# Patient Record
Sex: FEMALE | Race: WHITE | NOT HISPANIC OR LATINO | Employment: FULL TIME | URBAN - METROPOLITAN AREA
[De-identification: names, ages, dates, MRNs, and addresses within clinical notes are randomized per-mention and may not be internally consistent; named-entity substitution may affect disease eponyms.]

---

## 2017-03-06 ENCOUNTER — GENERIC CONVERSION - ENCOUNTER (OUTPATIENT)
Dept: OTHER | Facility: OTHER | Age: 37
End: 2017-03-06

## 2017-03-23 ENCOUNTER — GENERIC CONVERSION - ENCOUNTER (OUTPATIENT)
Dept: OTHER | Facility: OTHER | Age: 37
End: 2017-03-23

## 2017-03-31 ENCOUNTER — GENERIC CONVERSION - ENCOUNTER (OUTPATIENT)
Dept: OTHER | Facility: OTHER | Age: 37
End: 2017-03-31

## 2017-04-24 ENCOUNTER — GENERIC CONVERSION - ENCOUNTER (OUTPATIENT)
Dept: OTHER | Facility: OTHER | Age: 37
End: 2017-04-24

## 2017-06-20 ENCOUNTER — GENERIC CONVERSION - ENCOUNTER (OUTPATIENT)
Dept: OTHER | Facility: OTHER | Age: 37
End: 2017-06-20

## 2017-07-07 ENCOUNTER — APPOINTMENT (OUTPATIENT)
Dept: LAB | Facility: CLINIC | Age: 37
End: 2017-07-07
Payer: COMMERCIAL

## 2017-07-07 ENCOUNTER — ALLSCRIPTS OFFICE VISIT (OUTPATIENT)
Dept: OTHER | Facility: OTHER | Age: 37
End: 2017-07-07

## 2017-07-07 DIAGNOSIS — M25.50 PAIN IN JOINT: ICD-10-CM

## 2017-07-07 DIAGNOSIS — D72.829 ELEVATED WHITE BLOOD CELL COUNT: ICD-10-CM

## 2017-07-07 DIAGNOSIS — G47.8 OTHER SLEEP DISORDERS: ICD-10-CM

## 2017-07-07 DIAGNOSIS — R53.83 OTHER FATIGUE: ICD-10-CM

## 2017-07-07 LAB
CRP SERPL QL: <3 MG/L
ERYTHROCYTE [DISTWIDTH] IN BLOOD BY AUTOMATED COUNT: 13.4 % (ref 11.6–15.1)
ERYTHROCYTE [SEDIMENTATION RATE] IN BLOOD: 4 MM/HOUR (ref 0–20)
HCT VFR BLD AUTO: 40.7 % (ref 34.8–46.1)
HGB BLD-MCNC: 13.6 G/DL (ref 11.5–15.4)
MCH RBC QN AUTO: 30.4 PG (ref 26.8–34.3)
MCHC RBC AUTO-ENTMCNC: 33.4 G/DL (ref 31.4–37.4)
MCV RBC AUTO: 91 FL (ref 82–98)
PLATELET # BLD AUTO: 534 THOUSANDS/UL (ref 149–390)
PMV BLD AUTO: 9.5 FL (ref 8.9–12.7)
RBC # BLD AUTO: 4.48 MILLION/UL (ref 3.81–5.12)
T3FREE SERPL-MCNC: 3.05 PG/ML (ref 2.3–4.2)
T4 FREE SERPL-MCNC: 1.08 NG/DL (ref 0.76–1.46)
TSH SERPL DL<=0.05 MIU/L-ACNC: 2.34 UIU/ML (ref 0.36–3.74)
WBC # BLD AUTO: 7.44 THOUSAND/UL (ref 4.31–10.16)

## 2017-07-07 PROCEDURE — 83516 IMMUNOASSAY NONANTIBODY: CPT

## 2017-07-07 PROCEDURE — 85027 COMPLETE CBC AUTOMATED: CPT

## 2017-07-07 PROCEDURE — 86038 ANTINUCLEAR ANTIBODIES: CPT

## 2017-07-07 PROCEDURE — 82784 ASSAY IGA/IGD/IGG/IGM EACH: CPT

## 2017-07-07 PROCEDURE — 86255 FLUORESCENT ANTIBODY SCREEN: CPT

## 2017-07-07 PROCEDURE — 86225 DNA ANTIBODY NATIVE: CPT

## 2017-07-07 PROCEDURE — 85613 RUSSELL VIPER VENOM DILUTED: CPT

## 2017-07-07 PROCEDURE — 81241 F5 GENE: CPT

## 2017-07-07 PROCEDURE — 84481 FREE ASSAY (FT-3): CPT

## 2017-07-07 PROCEDURE — 86618 LYME DISEASE ANTIBODY: CPT

## 2017-07-07 PROCEDURE — 86200 CCP ANTIBODY: CPT

## 2017-07-07 PROCEDURE — 85732 THROMBOPLASTIN TIME PARTIAL: CPT

## 2017-07-07 PROCEDURE — 86235 NUCLEAR ANTIGEN ANTIBODY: CPT

## 2017-07-07 PROCEDURE — 84439 ASSAY OF FREE THYROXINE: CPT

## 2017-07-07 PROCEDURE — 86140 C-REACTIVE PROTEIN: CPT

## 2017-07-07 PROCEDURE — 84443 ASSAY THYROID STIM HORMONE: CPT

## 2017-07-07 PROCEDURE — 81374 HLA I TYPING 1 ANTIGEN LR: CPT

## 2017-07-07 PROCEDURE — 36415 COLL VENOUS BLD VENIPUNCTURE: CPT

## 2017-07-07 PROCEDURE — 85652 RBC SED RATE AUTOMATED: CPT

## 2017-07-07 PROCEDURE — 85670 THROMBIN TIME PLASMA: CPT

## 2017-07-07 PROCEDURE — 85705 THROMBOPLASTIN INHIBITION: CPT

## 2017-07-08 LAB
DSDNA AB SER-ACNC: 2 IU/ML (ref 0–9)
ENA SS-A AB SER-ACNC: <0.2 AI (ref 0–0.9)
ENA SS-B AB SER-ACNC: <0.2 AI (ref 0–0.9)

## 2017-07-10 ENCOUNTER — GENERIC CONVERSION - ENCOUNTER (OUTPATIENT)
Dept: OTHER | Facility: OTHER | Age: 37
End: 2017-07-10

## 2017-07-10 LAB
B BURGDOR IGG SER IA-ACNC: 0.06
B BURGDOR IGM SER IA-ACNC: 0.24
ENDOMYSIUM IGA SER QL: NEGATIVE
GLIADIN PEPTIDE IGA SER-ACNC: 3 UNITS (ref 0–19)
GLIADIN PEPTIDE IGG SER-ACNC: 4 UNITS (ref 0–19)
IGA SERPL-MCNC: 99 MG/DL (ref 87–352)
RYE IGE QN: NEGATIVE
TTG IGA SER-ACNC: <2 U/ML (ref 0–3)
TTG IGA SER-ACNC: <2 U/ML (ref 0–3)
TTG IGG SER-ACNC: <2 U/ML (ref 0–5)

## 2017-07-11 ENCOUNTER — GENERIC CONVERSION - ENCOUNTER (OUTPATIENT)
Dept: OTHER | Facility: OTHER | Age: 37
End: 2017-07-11

## 2017-07-11 LAB
CCP IGA+IGG SERPL IA-ACNC: 4 UNITS (ref 0–19)
COMMENT: NORMAL
F5 GENE MUT ANL BLD/T: NORMAL

## 2017-07-12 LAB
APTT SCREEN TO CONFIRM RATIO: 1.21 RATIO (ref 0–1.4)
CONFIRM APTT/NORMAL: 44.5 SEC (ref 0–55)
HLA-B27 QL NAA+PROBE: POSITIVE
LA PPP-IMP: NORMAL
LABORATORY COMMENT REPORT: NORMAL
SCREEN APTT: 32.9 SEC (ref 0–51.9)
SCREEN DRVVT: 37.5 SEC (ref 0–47)
THROMBIN TIME: 21.2 SEC (ref 0–23)

## 2017-07-14 ENCOUNTER — GENERIC CONVERSION - ENCOUNTER (OUTPATIENT)
Dept: OTHER | Facility: OTHER | Age: 37
End: 2017-07-14

## 2017-07-26 ENCOUNTER — ALLSCRIPTS OFFICE VISIT (OUTPATIENT)
Dept: OTHER | Facility: OTHER | Age: 37
End: 2017-07-26

## 2017-08-02 ENCOUNTER — GENERIC CONVERSION - ENCOUNTER (OUTPATIENT)
Dept: OTHER | Facility: OTHER | Age: 37
End: 2017-08-02

## 2017-08-10 ENCOUNTER — ALLSCRIPTS OFFICE VISIT (OUTPATIENT)
Dept: OTHER | Facility: OTHER | Age: 37
End: 2017-08-10

## 2017-08-10 ENCOUNTER — APPOINTMENT (OUTPATIENT)
Dept: RADIOLOGY | Facility: CLINIC | Age: 37
End: 2017-08-10
Payer: COMMERCIAL

## 2017-08-10 DIAGNOSIS — M54.50 LOW BACK PAIN: ICD-10-CM

## 2017-08-10 DIAGNOSIS — Z15.89 GENETIC SUSCEPTIBILITY TO OTHER DISEASE(V84.89): ICD-10-CM

## 2017-08-10 PROCEDURE — 72202 X-RAY EXAM SI JOINTS 3/> VWS: CPT

## 2017-09-12 ENCOUNTER — GENERIC CONVERSION - ENCOUNTER (OUTPATIENT)
Dept: OTHER | Facility: OTHER | Age: 37
End: 2017-09-12

## 2017-10-10 ENCOUNTER — GENERIC CONVERSION - ENCOUNTER (OUTPATIENT)
Dept: OTHER | Facility: OTHER | Age: 37
End: 2017-10-10

## 2017-10-18 ENCOUNTER — GENERIC CONVERSION - ENCOUNTER (OUTPATIENT)
Dept: OTHER | Facility: OTHER | Age: 37
End: 2017-10-18

## 2018-01-09 NOTE — MISCELLANEOUS
Message  I received message from patient regarding x-ray results  I called patient at [phone], but there was no answer  I left voicemail explaining that her x-rays were negative for any sort of inflammatory changes  I instructed her to contact our office if she had any additional questions or concerns        Signatures   Electronically signed by : Mckayla Craft DO; Sep 12 2017  3:32PM EST                       (Author)

## 2018-01-10 NOTE — RESULT NOTES
Discussion/Summary   NEGATIVE FOR FACTOR V LAIDEN MUTATION    - DR WATSON      Verified Results  (1) FACTOR V LEIDEN MUTATION DNA ANALYSIS [id number] 02:29PM Kerline Watson Order Number: [id number]     Test Name Result Flag Reference   FACTOR V LEIDEN Comment     Result:  Negative (no mutation found)  Factor V Leiden is a specific mutation (R506Q) in the factor  V gene that is associated with an increased risk of venous  thrombosis  Factor V Leiden is more resistant to  inactivation by activated protein C  As a result, factor V  persists in the circulation leading to a mild hyper-  coagulable state  The Leiden mutation accounts for 90% -  95% of APC resistance  Factor V Leiden has been reported in  patients with deep vein thrombosis, pulmonary embolus,  central retinal vein occlusion, cerebral sinus thrombosis  and hepatic vein thrombosis  Other risk factors to be  considered in the workup for venous thrombosis include the  [id number] mutation in the factor II (prothrombin) gene,  protein S and C deficiency, and antithrombin deficiencies  Anticardiolipin antibody and lupus anticoagulant analysis  may be appropriate for certain patients, as well as  homocysteine levels  Contact your local [facility] for information on how to order  additional testing if desired  COMMENT Comment     **Genetic counselors are available for health care providers to**    discuss results at [phone]  Methodology:  DNA analysis of the Factor V gene was performed by allele-specific  PCR  The diagnostic sensitivity and specificity is >99% for both  Molecular-based testing is highly accurate, but as in any laboratory  test, diagnostic errors may occur  All test results must be combined  with clinical information for the most accurate interpretation    This test was developed and its performance characteristics determined  by [facility]  It has not been cleared or approved by the Food and Drug  Administration  References:  Kylah GROVES (1996)  Clin Lab Med 55:498-102    Astrid Houser, PhD, Himanshu Doherty, PhD, Jeanne Jennings, PhD, RELL Davalos , PhD, Jorge L Dover, PhD, Lorenzo Renee, PhD, Doug Anthony PhD, [facility]     Performed at:  [address] [place] [address]  [id number]  : Lawson Nettles MD, Phone:  [phone]       Signatures   Electronically signed by : Michele Tse MD; Jul 11 2017  3:49PM EST                       (Author)

## 2018-01-12 VITALS
HEART RATE: 64 BPM | HEIGHT: 66 IN | BODY MASS INDEX: 23.95 KG/M2 | WEIGHT: 149 LBS | DIASTOLIC BLOOD PRESSURE: 82 MMHG | SYSTOLIC BLOOD PRESSURE: 120 MMHG

## 2018-01-12 NOTE — RESULT NOTES
Discussion/Summary   one of the arthritis panel called HLA B 27 came back positive which can cause diffuse joint pain  i want her to a rheumatologist  she can see whoever her insurance alllows her and whoever can get her in sooner   - Dr Alcon Walker      Verified Results  (1) HLA B27 [id number] 02:29PM Cisco Watson Order Number: [id number]     Test Name Result Flag Reference   HLA B27 Positive     HLA-B*27 Positive  This patient is positive for HLA-B*27  This procedure rules  out the B*27:06 and 27:09 alleles, which the literature  suggests are not associated with spondyloarthropathies  HLA allele interpretation for all loci based on IMGT/HLA  database version 3 25 0  This test was developed and its performance characteristics  determined by [facility]   It has not been cleared or approved  by the Food and Drug Administration  [facility] CLIA ID Number [id number]  This test was performed using PCR (Polymerase Chain Reaction)/SSOP  (Sequence Specific Oligonucleotide Probes) technique  SBT (Sequence  Based Typing) and/or SSP (Sequence Specific Primers) may be used as  supplemental methods when necessary  Please contact HLA Customer  Service at [phone] if you have any questions     Director of [facility]   Dr Luz Gee, PhD    Performed at:  [address]  : Cuate Syed PhD, Phone:  [phone]       Signatures   Electronically signed by : Renee Molina MD; Jul 14 2017  8:06AM EST                       (Author)

## 2018-01-13 VITALS
BODY MASS INDEX: 23.81 KG/M2 | WEIGHT: 143.5 LBS | HEART RATE: 100 BPM | DIASTOLIC BLOOD PRESSURE: 86 MMHG | SYSTOLIC BLOOD PRESSURE: 116 MMHG | RESPIRATION RATE: 16 BRPM

## 2018-01-14 VITALS
WEIGHT: 149.13 LBS | RESPIRATION RATE: 16 BRPM | DIASTOLIC BLOOD PRESSURE: 82 MMHG | SYSTOLIC BLOOD PRESSURE: 138 MMHG | BODY MASS INDEX: 24.07 KG/M2 | HEART RATE: 64 BPM

## 2018-01-15 NOTE — PROGRESS NOTES
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)   4  Depression (311) (F32 9)    Plan  Arthralgia of multiple joints, Depression    · FLUoxetine HCl - 10 MG Oral Capsule (PROzac); take 1 capsule daily  Arthralgia of multiple joints, HLA B27 positive    · 1 - Erendira Ingram DO ( Rheumatology) Co-Management  *  Status: Active   Requested for: 94WSG3921  Care Summary provided  : Yes  Health Maintenance    · Follow-up visit in 1 year Evaluation and Treatment  Follow-up  Status: Hold For -  Scheduling  Requested for: 56QKH6419   · Begin a limited exercise program ; Status:Complete;   Done: 37GYD4331 10:35AM   · Begin or continue regular aerobic exercise  Gradually work up to at least 3 sessions of 30  minutes of exercise a week ; Status:Complete;   Done: 02MKZ3800 10:35AM   · Brush your teeth 3 times a day and floss at least once a day ; Status:Complete;   Done:  16KWN4235 10:35AM   · Eat a low fat and low cholesterol diet ; Status:Complete;   Done: 76ONC6864 10:35AM   · Use a sun block product with an SPF of 15 or more ; Status:Complete;   Done: 70VRW9361  10:35AM   · Vitamins can help you get daily requirements that your diet may not be giving you ;  Status:Complete;   Done: 05EJC3045 10:35AM   · We recommend routine visits to a dentist ; Status:Complete;   Done: 55WNX8383 10:35AM   · We recommend that you bring your body mass index down to 26 ; Status:Complete;    Done: 97IUE7608 10:35AM   · We recommend that you follow these steps to lower your risk of osteoporosis  ;  Status:Complete;   Done: 26XRQ2136 10:35AM   · We recommend you modify your diet to achieve and maintain a healthy weight  Being  underweight may increase your risk of developing health problems from vitamin and  mineral deficiencies  We recommend a balanced diet rich in fruits and vegetables   You  may also consider increasing your calorie intake by eating more frequently or adding  nuts, avocados, and low-fat cheese or milk to your meals  Please let us know  if you would like to learn more about your nutrition and calorie needs, and additional  options to help you achieve your weight goals ; Status:Complete;   Done: 44KIC5488  10:35AM   · Call [phone] if: You find a new or different kind of lump in your breast ;  Status:Complete;   Done: 98TAG5594 10:35AM   · Call [phone] if: You have any warning signs of skin cancer ; Status:Complete;    Done: 64PDI1485 10:35AM   · Call 911 if: You have symptoms of toxic shock ; Status:Complete;   Done: 16QBM8161  10:35AM  Low back pain    · Cyclobenzaprine HCl - 10 MG Oral Tablet; TAKE 1 TABLET 3 TIMES DAILY AS  NEEDED  Social phobia    · ALPRAZolam 0 25 MG Oral Tablet (Xanax); Take 1 tablet daily    Discussion/Summary  health maintenance visit Currently, she eats an adequate diet  the risks and benefits of cervical cancer screening were discussed cervical cancer screening is needed every three years Breast cancer screening: the risks and benefits of breast cancer screening were discussed and breast cancer screening is not indicated  Colorectal cancer screening: the risks and benefits of colorectal cancer screening were discussed  The immunizations are up to date  She was advised to be evaluated by an ophthalmologist and an audiologist  Advice and education were given regarding aerobic exercise, weight bearing exercise, calcium supplements, vitamin D supplements and contraception  Patient discussion: discussed with the patient  Seeing Dr Rob Noyola next week for her TMJ arthralgia  Chief Complaint  Pt here for Annual Physical      History of Present Illness  HM, Adult Female: The patient is being seen for a health maintenance evaluation  General Health: The patient's health since the last visit is described as good  She has regular dental visits   She complains of vision problems  She denies hearing loss  Immunizations status: not up to date  Lifestyle:  She consumes a diverse and healthy diet  She does not have any weight concerns  She does not exercise regularly  She does not use tobacco  She consumes alcohol  She denies drug use  Reproductive health:  she reports normal menses  she uses no contraception  she is not sexually active  she denies prior pregnancies  Screening: cancer screening reviewed and updated  metabolic screening reviewed and updated  risk screening reviewed and updated  Review of Systems    Constitutional: No fever, no chills, feels well, no tiredness, no recent weight gain or weight loss  Eyes: No complaints of eye pain, no red eyes, no eyesight problems, no discharge, no dry eyes, no itching of eyes  ENT: no complaints of earache, no loss of hearing, no nose bleeds, no nasal discharge, no sore throat, no hoarseness  Cardiovascular: No complaints of slow heart rate, no fast heart rate, no chest pain, no palpitations, no leg claudication, no lower extremity edema  Respiratory: No complaints of shortness of breath, no wheezing, no cough, no SOB on exertion, no orthopnea, no PND  Gastrointestinal: No complaints of abdominal pain, no constipation, no nausea or vomiting, no diarrhea, no bloody stools  Genitourinary: No complaints of dysuria, no incontinence, no pelvic pain, no dysmenorrhea, no vaginal discharge or bleeding  Musculoskeletal: arthralgias and myalgias  Integumentary: No complaints of skin rash or lesions, no itching, no skin wounds, no breast pain or lump  Neurological: No complaints of headache, no confusion, no convulsions, no numbness, no dizziness or fainting, no tingling, no limb weakness, no difficulty walking  Psychiatric: Not suicidal, no sleep disturbance, no anxiety or depression, no change in personality, no emotional problems     Endocrine: No complaints of proptosis, no hot flashes, no muscle weakness, no deepening of the voice, no feelings of weakness  Hematologic/Lymphatic: No complaints of swollen glands, no swollen glands in the neck, does not bleed easily, does not bruise easily  Active Problems    1  Arthralgia of multiple joints (719 49) (M25 50)   2  Attention or concentration deficit (799 51) (R41 840)   3  Depression (311) (F32 9)   4  Encounter for screening for cardiovascular disorders (V81 2) (Z13 6)   5  Fatigue (780 79) (R53 83)   6  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)   7  Insomnia (780 52) (G47 00)   8  Leukocytosis (288 60) (D72 829)   9  Low back pain (724 2) (M54 5)   10  Screening for diabetes mellitus (V77 1) (Z13 1)   11  Screening for lipoid disorders (V77 91) (Z13 220)   12  Social phobia (300 23) (F40 10)   13  Upper airway resistance syndrome (327 8) (G47 8)    Past Medical History    · History of migraine (V12 49) (Z86 69)    Surgical History    · History of Breast Surgery Reduction Procedure    Family History  Father    · Family history of cerebrovascular accident (V17 1) (Z82 3)   · Family history of factor V Leiden mutation (V18 3) (Z83 2)   · Family history of Hypertension, benign  Maternal Grandmother    · Family history of Anxiety and depression  Maternal Grandfather    · Family history of cardiac disorder (V17 49) (Z82 49)    Social History    · Never a smoker   · Social alcohol use (Z78 9)    Current Meds   1  ALPRAZolam 0 25 MG Oral Tablet; Take 1 tablet daily; Therapy: 09XCA4149 to (Evaluate:06Nov2016); Last Rx:07Oct2016 Ordered   2  Cyclobenzaprine HCl - 10 MG Oral Tablet; TAKE 1 TABLET 3 TIMES DAILY AS NEEDED; Therapy: 73XEY9254 to (Evaluate:14Ctc7898)  Requested for: 68ZQQ1047; Last   Rx:73Jks5599 Ordered   3  Fish Oil + D3 CAPS; take 1 capsule daily; Therapy: (Recorded:42Lxj9211) to Recorded   4  Multivitamins CAPS; TAKE 1 CAPSULE DAILY; Therapy: (Recorded:65Mop4024) to Recorded   5  Vyvanse 30 MG Oral Capsule; Therapy: (Recorded:02Jun2016) to Recorded   6  Wellbutrin  MG Oral Tablet Extended Release 24 Hour; Therapy: (Recorded:02Jun2016) to Recorded    Allergies    1  Latex Exam Gloves MISC   2  Erythromycin Derivatives    Vitals   Recorded: 09Xdz3094 09:56AM   Heart Rate 68   Respiration 16   Systolic 760   Diastolic 84   Height 5 ft 5 39 in   Weight 146 lb 4 oz   BMI Calculated 24 05   BSA Calculated 1 74     Physical Exam    Constitutional   General appearance: No acute distress, well appearing and well nourished  Head and Face   Head and face: Normal     Palpation of the face and sinuses: No sinus tenderness  Eyes   Conjunctiva and lids: No swelling, erythema or discharge  Pupils and irises: Equal, round, reactive to light  Ophthalmoscopic examination: Normal fundi and optic discs  Ears, Nose, Mouth, and Throat   External inspection of ears and nose: Normal     Otoscopic examination: Tympanic membranes translucent with normal light reflex  Canals patent without erythema  Hearing: Normal     Nasal mucosa, septum, and turbinates: Normal without edema or erythema  Lips, teeth, and gums: Normal, good dentition  Oropharynx: Normal with no erythema, edema, exudate or lesions  Neck   Neck: Supple, symmetric, trachea midline, no masses  Thyroid: Normal, no thyromegaly  Pulmonary   Respiratory effort: No increased work of breathing or signs of respiratory distress  Percussion of chest: Normal     Auscultation of lungs: Clear to auscultation  Cardiovascular   Palpation of heart: Normal PMI, no thrills  Auscultation of heart: Normal rate and rhythm, normal S1 and S2, no murmurs  Examination of extremities for edema and/or varicosities: Normal     Chest   Chest: Normal     Abdomen   Abdomen: Non-tender, no masses  Liver and spleen: No hepatomegaly or splenomegaly  Examination for hernias: No hernia appreciated  Lymphatic   Palpation of lymph nodes in neck: No lymphadenopathy      Palpation of lymph nodes in axillae: No lymphadenopathy  Palpation of lymph nodes in groin: No lymphadenopathy  Musculoskeletal   Gait and station: Normal     Digits and nails: Normal without clubbing or cyanosis  Joints, bones, and muscles: Normal     Range of motion: Normal     Stability: Normal     Muscle strength/tone: Normal     Skin   Skin and subcutaneous tissue: Normal without rashes or lesions  Palpation of skin and subcutaneous tissue: Normal turgor  Neurologic   Cranial nerves: Cranial nerves II-XII intact  Cortical function: Normal mental status  Reflexes: 2+ and symmetric  Sensation: No sensory loss  Coordination: Normal finger to nose and heel to shin  Psychiatric   Judgment and insight: Normal     Orientation to person, place, and time: Normal     Recent and remote memory: Intact      Mood and affect: Normal        Signatures   Electronically signed by : Vic Gracia MD; Jul 26 2017 10:35AM EST                       (Author)

## 2018-01-17 NOTE — RESULT NOTES
Discussion/Summary   lyme test was negative  normal blood count, thyroid level, celiac disease and lupus panel     - Dr Anita Sebastian      Verified Results  (1) C-REACTIVE PROTEIN [id number] 02:29PM Gabriella Watson Order Number: [id number]     Test Name Result Flag Reference   C-REACT PROTEIN <3 0 mg/L  <3 0     (1) DNA (DS) ANTIBODY [id number] 02:29PM Walter Gabriella Spice Order Number: [id number]     Test Name Result Flag Reference   DNA (DS) ANTIB 2 IU/mL  0 - 9   Negative      <5                                     Equivocal  5 - 9                                     Positive      >9    Performed at:  [address] [place] [address]  [id number]  : Pascual Mora MD, Phone:  [phone]     (1) LYME ANTIBODY PROFILE W/REFLEX TO WESTERN BLOT [id number] 02:29PM Roma Segundo Order Number: [id number]     Test Name Result Flag Reference   LYME IGG 0 06  0 00-0 79   NEGATIVE(0 00-0 79)-Absence of detectable Borrelia IgG Antibodies  A negative result does not exclude the possibility of Borrelia infection  If early Lyme disease is suspected,a second sample should be collected & tested 4 weeks after initial testing  LYME IGM 0 24  0 00-0 79   NEGATIVE (0 00-0 79)-Absence of detectable Borrelia IgM antibodies  A negative result does not exclude the possibility of Borrelia infection  If early lyme disease is suspected, a second sample should be collected & tested 4 weeks after initial testing      (1) TSH WITH FT4 REFLEX [id number] 02:29PM Walter Gabriellamarita Eddy Order Number: [id number]     Test Name Result Flag Reference   TSH 2 340 uIU/mL  0 358-3 740   Patients undergoing fluorescein dye angiography may retain small amounts of fluorescein in the body for 48-72 hours post procedure  Samples containing fluorescein can produce falsely depressed TSH values   If the patient had this procedure,a specimen should be resubmitted post fluorescein clearance  The recommended reference ranges for TSH during pregnancy are as follows:  First trimester 0 1 to 2 5 uIU/mL  Second trimester  0 2 to 3 0 uIU/mL  Third trimester 0 3 to 3 0 uIU/m     (1) [facility] ANTIBODIES [id number] 02:29PM Mary Ellen Watson Order Number: [id number]     Test Name Result Flag Reference   SS-A <0 2 AI  0 0 - 0 9   SS-B <0 2 AI  0 0 - 0 9   Performed at:  [address] [place] [address]  [id number]  : Davide Nielson MD, Phone:  [phone]     (1) SED RATE [id number] 02:29PM David Matthews Order Number: [id number]     Test Name Result Flag Reference   SED RATE 4 mm/hour  0-20     (1) TISSUE TRANSGLUTAMINASE IGA [id number] 02:29PM Mary Ellen Watson Order Number: [id number]     Test Name Result Flag Reference   tTG IGA <2 U/mL  0 - 3   Negative        0 -  3                                Weak Positive   4 - 10                                Positive           >10   Tissue Transglutaminase (tTG) has been identified   as the endomysial antigen  Studies have demonstr-   ated that endomysial IgA antibodies have over 99%   specificity for gluten sensitive enteropathy  Performed at:  [address] [place] [address]  [id number]  : Davide Nielson MD, Phone:  [phone]     (1) CELIAC DISEASE AB PROFILE [id number] 02:29PM David Matthews Order Number: [id number]     Test Name Result Flag Reference   tTG IGG <2 U/mL  0 - 5   Negative        0 - 5                                Weak Positive   6 - 9                                Positive           >9   tTG IGA <2 U/mL  0 - 3   Negative        0 -  3                                Weak Positive   4 - 10                                Positive           >10   Tissue Transglutaminase (tTG) has been identified   as the endomysial antigen    Studies have demonstr-   ated that endomysial IgA antibodies have over 99%   specificity for gluten sensitive enteropathy  GLIADA 3 units  0 - 19   Negative                   0 - 19                     Weak Positive             20 - 30                     Moderate to Strong Positive   >30   GLIADG 4 units  0 - 19   Negative                   0 - 19                     Weak Positive             20 - 30                     Moderate to Strong Positive   >30   ENDOMYSIAL AB IGA Negative  Negative   Performed at:  [address] [place] [address]  [id number]  : Sarah Perez MD, Phone:  [phone]   IGA 99 mg/dL  87 - 352     (1) BAO SCREEN W/REFLEX TO TITER/PATTERN [id number] 02:29PM [employer] Order Number: [id number]     Test Name Result Flag Reference   BAO SCREEN   Negative  Negative     (1) FREE T3 [id number] 02:29PM [employer] Order Number: [id number]     Test Name Result Flag Reference   FREE T3 3 05 pg/mL  2 30-4 20     (1) T4, FREE [id number] 02:29PM [employer] Order Number: [id number]     Test Name Result Flag Reference   T4,FREE 1 08 ng/dL  0 76-1 46     (1) CBC/ PLT (NO DIFF) [id number] 02:29PM [facility] Order Number: [id number]     Test Name Result Flag Reference   HEMATOCRIT 40 7 %  34 8-46 1   HEMOGLOBIN 13 6 g/dL  11 5-15 4   MCHC 33 4 g/dL  31 4-37 4   MCH 30 4 pg  26 8-34 3   MCV 91 fL  82-98   PLATELET COUNT 218 Thousands/uL H 149-390   RBC COUNT 4 48 Million/uL  3 81-5 12   RDW 13 4 %  11 6-15 1   WBC COUNT 7 44 Thousand/uL  4 31-10 16   MPV 9 5 fL  8 9-12 7

## 2018-01-22 VITALS
SYSTOLIC BLOOD PRESSURE: 112 MMHG | HEART RATE: 68 BPM | HEIGHT: 65 IN | DIASTOLIC BLOOD PRESSURE: 84 MMHG | WEIGHT: 146.25 LBS | BODY MASS INDEX: 24.37 KG/M2 | RESPIRATION RATE: 16 BRPM

## 2018-03-06 ENCOUNTER — TRANSCRIBE ORDERS (OUTPATIENT)
Dept: ADMINISTRATIVE | Facility: HOSPITAL | Age: 38
End: 2018-03-06

## 2018-03-06 DIAGNOSIS — M89.9 BONE DISEASE: Primary | ICD-10-CM

## 2018-03-07 ENCOUNTER — TRANSCRIBE ORDERS (OUTPATIENT)
Dept: ADMINISTRATIVE | Facility: HOSPITAL | Age: 38
End: 2018-03-07

## 2018-03-13 ENCOUNTER — HOSPITAL ENCOUNTER (OUTPATIENT)
Dept: NUCLEAR MEDICINE | Facility: HOSPITAL | Age: 38
Discharge: HOME | End: 2018-03-13
Payer: COMMERCIAL

## 2018-03-13 DIAGNOSIS — M89.9 BONE DISEASE: ICD-10-CM

## 2018-03-13 PROCEDURE — A9503 TC99M MEDRONATE: HCPCS

## 2018-03-13 PROCEDURE — 78306 BONE IMAGING WHOLE BODY: CPT

## 2025-06-25 NOTE — PROGRESS NOTES
Assessment    1  Low back pain (724 2) (M54 5)   2  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)   3  Thrombocytosis (238 71) (D47 3)   4  Depression (311) (F32 9)   5  Social phobia (300 23) (F40 10)    Plan  HLA B27 positive, Low back pain    · XR SACROILIAC JOINTS 3+ VIEWS; Status:Active; Requested for:98Fkw7861;     Discussion/Summary    Ms Terri Saucedo is a 27-year-old  female who presents the office with a history of a positive HLA-B27 antigen on recent laboratory studies  She began having issues with her right hip in the posterior aspect in June 2016  She also is having left TMJ issues, which did improve recently after the replacement of a filling  She states that when she clenches her jaw, the right hip pain worsens  She also reports a prolonged sitting seems to worsen her pain  She states that her right hip pain began after a workout injury when she was trying to squat a fair amount of weight  She states that she initially had sciatica for proximally 6 weeks which has resolved  She now has some continued right quadriceps issues when attempting to work out  She reports that her pain is constant up until 3 weeks ago when the tooth was fixed  She states her pain is now more intermittent  She does report shoulder tightening with her jaw clenching as well  She does report that she did extensive yard work several weeks ago, and had some finger and wrist pain afterwards, as well as numbness in the hands  This is since resolved  She also complains of some pain in the ankles, but she has fractured both of her ankles in the past  She reports that her knees crack at times as well  She describes her pain as being aching in quality and the right hip, as well as "knot-like"in the left shoulder  She previously took Flexeril for 1 month, but had a significant amount of weight gain  She does feel that it did help somewhat with the pain   She is also trying Aleve, Advil, or Tylenol with some relief when she takes them on a consistent basis  She did note swelling in her fingers after the yard work which has now resolved  She denies any other obvious joint swelling  She denies any morning stiffness over the last 3 weeks  She does report nonrestorative sleep which has improved, as well as fatigue  On exam, there is no active synovitis  She has full range of motion of all the joints  There are multiple myofascial tender points throughout the spine  There is mild tenderness to palpation of the right patellar tendon insertion  There is crepitus of the right knee  There is no objective muscular weakness  Review of laboratory studies from July 7, 2017 revealed negative connective-tissue disease serologies, with the exception of a positive HLA-B27 antigen  CBC did show a mild thrombocytosis  TSH, T4, and T3 were within normal limits  ESR and CRP were also within normal limits  I personally reviewed x-rays of her lumbar spine from September 2016 which did not reveal any obvious abnormalities  Her sacroiliac joints were not adequately visualized  At this time, her history and exam are not fully consistent with a seronegative spondyloarthropathy, despite her positive HLA-B27 antigen  This antigen can be seen up to 8% of normal healthy patients who do not have an underlying inflammatory spondyloarthropathy  I will check formal x-rays of her sacroiliac joints to evaluate for sacroiliitis  If sacroiliitis is not seen, it would be very unlikely for her to have a seronegative spondyloarthropathy in the setting of normal inflammatory markers  I will not prescribe any medications at this time  I advised her to contact me several days after the x-rays performed so we may review the results and make further clinical decisions  For now, she will follow-up on an as-needed basis  She will call if she has any additional questions or concerns  Patient is able to Self-Care        Counseling  Rheumatology Counseling Documentation: The patient was counseled regarding diagnostic results, instructions for management and impressions  Chief Complaint  Arthralgias and abnormal labs      History of Present Illness  Pt  is a 39 yo  female who presents with history of HLA-B27 positive  Having issues with R hip which began in 6/2016  Also had jaw issues which improved with replacing a filling  When she clenches jaw, the R hip hurts  Sitting worsens hip pain as well  R hip pain began after a workout injury  Initially had sciatica for 6 weeks, but still has some R quadriceps issues as well  Pain was constant until 3 weeks ago when tooth was fixed  Now pain is intermittent  c/o shoulder tightening with jaw clenching as well  Following extensive yard work, she had some finger and wrist pain and well as numbness in the hands  c/o some pain in ankles, but has had fractures in past  Knees crack at times as well  Pain described as aching in R hip and "knot-like" in L shoulder  Previously took Flexeril for 1 month, but had weight gain  It helped somewhat with pain  Has also tried Advil, Aleve, or Tylenol with some relief when taking them consistently  + swelling in fingers after yard work -> now resolved  No other obvious joint swelling  No AM stiffness over the last 3 weeks  + non-restorative sleep -> improved  + fatigue  RAPID3: 13 3/30      Review of Systems    Constitutional: recent 15 lb weight gain and fatigue, but no fever, no chills, no recent weight loss and no anorexia  HEENT: blurred vision, dryness of the eyes and dryness mouth, but no double vision, no amaurosis fugax, no eye pain, no erythema eye(s), no mouth sores, not feeling congested and no sore throat  Cardiovascular: dyspnea on exertion, but no chest pain or pressure and no swelling in the arms or legs  Respiratory: no unusual or persistent cough, no shortness of breath and no pleurisy     Gastrointestinal: abdominal pain and heartburn, but no nausea, no vomiting, no diarrhea, no constipation, no melena and no BRBPR  Genitourinary: no foamy urine, but no dysuria and no hematuria  Musculoskeletal: as noted in HPI  Integumentary rash and photosensitivity, but no Raynaud's, no alopecia and no nail changes  Endocrine no polyuria and no polydipsia  Hematologic/Lymphatic: a tendency for easy bruising, but no unusual bleeding  Neurological: headache, but no vertigo or dizziness, no tingling and no weakness  Psychiatric: insomnia, non-restorative sleep, depression and anxiety  Active Problems    1  Arthralgia of multiple joints (719 49) (M25 50)   2  Attention or concentration deficit (799 51) (R41 840)   3  Depression (311) (F32 9)   4  Fatigue (780 79) (R53 83)   5  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)   6  Insomnia (780 52) (G47 00)   7  Leukocytosis (288 60) (D72 829)   8  Low back pain (724 2) (M54 5)   9  Social phobia (300 23) (F40 10)    Past Medical History    1  History of Cervical cancer screening (V76 2) (Z12 4)   2  History of Encounter for screening for cardiovascular disorders (V81 2) (Z13 6)   3  History of migraine (V12 49) (Z86 69)   4  History of Screening for diabetes mellitus (V77 1) (Z13 1)   5  History of Screening for lipoid disorders (V77 91) (Z13 220)   6  History of Upper airway resistance syndrome (327 8) (G47 8)    Surgical History    1  History of Breast Surgery Reduction Procedure   2  History of Oral Surgery Tooth Extraction [place] [facility]    OB History  Pregnancy History (Brief):   Prior pregnancies: : 0  Para: 0 (abortions) and 0 (living)   Additional pregnancy history details: 0 miscarriage(s)       Family History  Father    1  Family history of cerebrovascular accident (V17 1) (Z82 3)   2  Family history of factor V Leiden mutation (V18 3) (Z83 2)   3  Family history of Hypertension, benign  Maternal Grandmother    4  Family history of Anxiety and depression  Maternal Grandfather    5   Family history of cardiac disorder (V17 49) (Z82 49)  Family History 6  Denied: Family history of Crohn's disease   7  Denied: Family history of psoriasis   8  Denied: Family history of rheumatoid arthritis   9  Denied: Family history of systemic lupus erythematosus   10  Denied: Family history of ulcerative colitis    Social History    · Employed   · Never a smoker   · No illicit drug use   · Social alcohol use (Z78 9)    Current Meds   1  ALPRAZolam 0 25 MG Oral Tablet; Take 1 tablet daily; Therapy: [id number] to (Evaluate:25Aug2017); Last Rx:37Fne4840 Ordered   2  Calcium 600 + D TABS; TAKE 1 TABLET DAILY; Therapy: (Recorded:10Aug2017) to Recorded   3  Cymbalta 30 MG Oral Capsule Delayed Release Particles; take 1 capsule daily; Therapy: 07Aug2017 to (Evaluate:03Feb2018)  Requested for: 07Aug2017; Last   Rx:07Aug2017 Ordered   4  Glucosamine-Chondroitin Oral Tablet; take 2 tablet daily; Therapy: (Recorded:10Aug2017) to Recorded   5  Magnesium TABS; Take 1 tablet daily; Therapy: (Recorded:10Aug2017) to Recorded   6  Turmeric 500 MG Oral Capsule; take 1 capsule daily; Therapy: (Recorded:10Aug2017) to Recorded   7  Vyvanse 70 MG Oral Capsule; TAKE 1 CAPSULE DAILY IN THE MORNING; Therapy: (Recorded:10Aug2017) to Recorded   8  Wellbutrin  MG Oral Tablet Extended Release 24 Hour; TAKE 1 TABLET DAILY AS   DIRECTED; Therapy: (Recorded:10Aug2017) to Recorded    Allergies    1  Latex Exam Gloves MISC   2  Erythromycin Derivatives    Vitals  Signs   Recorded: 10Aug2017 09:17AM   Heart Rate: 64  Systolic: 729  Diastolic: 82  Height: 5 ft 6 in  Weight: 149 lb   BMI Calculated: 24 05  BSA Calculated: 1 76    Physical Exam    Constitutional   General appearance: No acute distress, well appearing and well nourished  Eyes   Conjunctiva and lids: No swelling, erythema or discharge  Pupils and irises: Equal, round and reactive to light      Ears, Nose, Mouth, and Throat   External inspection of ears and nose: Normal     Oropharynx: Normal with no erythema, edema, exudate lesions, or ulcers  Pulmonary   Respiratory effort: No increased work of breathing or signs of respiratory distress  Auscultation of lungs: Clear to auscultation  Cardiovascular   Auscultation of heart: Normal rate and rhythm, normal S1 and S2, without murmurs  Examination of extremities for edema and/or varicosities: Normal     Lymphatic   Palpation of lymph nodes in neck: No lymphadenopathy  Psychiatric   Orientation to person, place, and time: Normal     Mood and affect: Normal         Right Lower Extremity: Right Foot: Right Ankle: Right Knee: Tenderness: distal patella tendon  Right Hip:   Musculoskeletal - Joints, bones, and muscles: Abnormal  Palpation - right knee crepitus  Muscle strength/tone: Normal  Motor Strength Findings: right hand dominance, but normal upper extremity strength and normal lower extremity strength  Right upper extremity: shoulder flexion 5/5, shoulder extension 5/5, biceps 5/5, triceps 5/5, but normal hand   Left upper extremity shoulder flexion 5/5, shoulder extension 5/5, biceps 5/5, triceps 5/5, but normal hand   Right lower extremity strength: hip flexion 5/5, hip abduction 5/5, hip adduction 5/5, knee flexion 5/5, knee extension 5/5, ankle dorsiflexion 5/5, ankle plantar flexion 5/5  Left lower extremity strength: hip flexion 5/5, hip abduction 5/5, hip adduction 5/5, knee flexion 5/5, knee extension 5/5, ankle dorsiflexion 5/5, ankle plantar flexion 5/5  Skin - Skin and subcutaneous tissue: Normal    Neurologic - Reflexes: Normal  Deep tendon reflexes: 2+ right biceps, 2+ left biceps, 2+ right triceps, 2+ left triceps, 2+ right brachioradialis, 2+ left brachioradialis, 2+ right patella, 2+ left patella, 2+ right ankle jerk and 2+ left ankle jerk  Sensation: Normal    Additional Findings - + multiple myofascial tender points throughout spine        Results/Data  (1) C-REACTIVE PROTEIN [id number] 02:29PM Betty Watson Order Number: [id number]     Test Name Result Flag Reference   C-REACT PROTEIN <3 0 mg/L  <1 4     (1) CYCLIC CITRULLINATED PEPTIDE [id number] 02:29PM Luther Watson Order Number: [id number]     Test Name Result Flag Reference   CYCLIC CITRULLINATED PEPTIDE 4 units  0 - 19   Negative               <20                            Weak positive      20 - 39                            Moderate positive  40 - 59                            Strong positive        >59    Performed at:  [address]  [id number]  : Emma Muniz MD, Phone:  [phone]     (1) DNA (DS) ANTIBODY [id number] 02:29PM  Order Number: [id number]     Test Name Result Flag Reference   DNA (DS) ANTIB 2 IU/mL  0 - 9   Negative      <5                                     Equivocal  5 - 9                                     Positive      >9    Performed at:  [address] [place] [address]  [id number]  : Theodore Rodriguez MD, Phone:  [phone]     (1) HLA [facility] [id number] 02:29PM [facility] Order Number: [id number]     Test Name Result Flag Reference   HLA B27 Positive     HLA-B*27 Positive  This patient is positive for HLA-B*27  This procedure rules  out the B*27:06 and 27:09 alleles, which the literature  suggests are not associated with spondyloarthropathies  HLA allele interpretation for all loci based on IMGT/HLA  database version 3 25 0  This test was developed and its performance characteristics  determined by [facility]   It has not been cleared or approved  by the Food and Drug Administration  [facility] CLIA ID Number [id number]  This test was performed using PCR (Polymerase Chain Reaction)/SSOP  (Sequence Specific Oligonucleotide Probes) technique  SBT (Sequence  Based Typing) and/or SSP (Sequence Specific Primers) may be used as  supplemental methods when necessary    Please contact HLA Customer  Service at [phone] if you have any questions  Director of [facility]   Dr Carly Robles, PhD    Performed at:  [address], [address] [place] [address]  : Jamil Campos PhD, Phone:  [phone]     (1) LUPUS ANTICOAGULANT PROFILE [id number] 02:29PM [employer] [facility] Order Number: [id number]     Test Name Result Flag Reference   PTT LUPUS ANTICOAGULANT 32 9 sec  0 0 - 51 9   **Please note reference interval change**   DILUTE SID VIPER VENOM TIME 37 5 sec  0 0 - 47 0   DILUTE PROTHROMBIN TIME(DPT) 44 5 sec  0 0 - 55 0   THROMBIN TIME (DRVW) 21 2 sec  0 0 - 23 0   **Please note reference interval change**   DPT CONFIRM RATIO 1 21 Ratio  0 00 - 1 40   LUPUS REFLEX INTERPRETATION Comment:     No lupus anticoagulant was detected  Performed at:  [address]  [id number]  : Joseph Blackman MD, Phone:  [phone]   PLEASE NOTE Comment     The date recorded on the requisition indicates the sample(s) received  were greater than 67 hours old upon arrival in our laboratory  Performed at:  [address] [place], [place] [id number]  : Daisy Mas MD, Phone:  [phone]     (1) LYME ANTIBODY PROFILE W/REFLEX TO [facility] [id number] 02:29PM [employer] Order Number: [id number]     Test Name Result Flag Reference   LYME IGG 0 06  0 00-0 79   NEGATIVE(0 00-0 79)-Absence of detectable Borrelia IgG Antibodies  A negative result does not exclude the possibility of Borrelia infection  If early Lyme disease is suspected,a second sample should be collected & tested 4 weeks after initial testing  LYME IGM 0 24  0 00-0 79   NEGATIVE (0 00-0 79)-Absence of detectable Borrelia IgM antibodies  A negative result does not exclude the possibility of Borrelia infection   If early lyme disease is suspected, a second sample should be collected & tested 4 weeks after initial testing      (1) TSH WITH FT4 REFLEX [id number] 02:29PM Irene Watson Order Number: [id number]     Test Name Result Flag Reference   TSH 2 340 uIU/mL  0 358-3 740   Patients undergoing fluorescein dye angiography may retain small amounts of fluorescein in the body for 48-72 hours post procedure  Samples containing fluorescein can produce falsely depressed TSH values  If the patient had this procedure,a specimen should be resubmitted post fluorescein clearance  The recommended reference ranges for TSH during pregnancy are as follows:  First trimester 0 1 to 2 5 uIU/mL  Second trimester  0 2 to 3 0 uIU/mL  Third trimester 0 3 to 3 0 uIU/m     (1) [facility] ANTIBODIES [id number] 02:29PM Irene Watson Order Number: [id number]     Test Name Result Flag Reference   SS-A <0 2 AI  0 0 - 0 9   SS-B <0 2 AI  0 0 - 0 9   Performed at:  [facility] [address]  [id number]  : David Hanks MD, Phone:  [phone]     (1) SED RATE [id number] 02:29PM Christin Saul Order Number: [id number]     Test Name Result Flag Reference   SED RATE 4 mm/hour  0-20     (1) TISSUE TRANSGLUTAMINASE IGA [id number] 02:29PM Irene Watson Order Number: [id number]     Test Name Result Flag Reference   tTG IGA <2 U/mL  0 - 3   Negative        0 -  3                                Weak Positive   4 - 10                                Positive           >10   Tissue Transglutaminase (tTG) has been identified   as the endomysial antigen  Studies have demonstr-   ated that endomysial IgA antibodies have over 99%   specificity for gluten sensitive enteropathy      Performed at:  [facility] [address]  [id number]  : David Hanks MD, Phone:  [phone]     (1) CELIAC DISEASE AB PROFILE [id number] 02:29PM Christin Saul Order Number: [id number]     Test Name Result Flag Reference   tTG IGG <2 U/mL  0 - 5 Negative        0 - 5                                Weak Positive   6 - 9                                Positive           >9   tTG IGA <2 U/mL  0 - 3   Negative        0 -  3                                Weak Positive   4 - 10                                Positive           >10   Tissue Transglutaminase (tTG) has been identified   as the endomysial antigen  Studies have demonstr-   ated that endomysial IgA antibodies have over 99%   specificity for gluten sensitive enteropathy  GLIADA 3 units  0 - 19   Negative                   0 - 19                     Weak Positive             20 - 30                     Moderate to Strong Positive   >30   GLIADG 4 units  0 - 19   Negative                   0 - 19                     Weak Positive             20 - 30                     Moderate to Strong Positive   >30   ENDOMYSIAL AB IGA Negative  Negative   Performed at:  [address] [place] [address]  [id number]  : Ashley Mccullough MD, Phone:  [phone]   IGA 99 mg/dL  87 - 352     (1) BAO SCREEN W/REFLEX TO TITER/PATTERN [id number] 02:29PM [facility] [place] Order Number: [id number]     Test Name Result Flag Reference   BAO SCREEN   Negative  Negative     (1) FREE T3 [id number] 02:29PM [facility] [place] Order Number: [id number]     Test Name Result Flag Reference   FREE T3 3 05 pg/mL  2 30-4 20     (1) T4, FREE [id number] 02:29PM [facility] [place] Order Number: [id number]     Test Name Result Flag Reference   T4,FREE 1 08 ng/dL  0 76-1 46     (1) CBC/ PLT (NO DIFF) [id number] 02:29PM [facility] [place] Order Number: [id number]     Test Name Result Flag Reference   HEMATOCRIT 40 7 %  34 8-46 1   HEMOGLOBIN 13 6 g/dL  11 5-15 4   MCHC 33 4 g/dL  31 4-37 4   MCH 30 4 pg  26 8-34 3   MCV 91 fL  82-98   PLATELET COUNT 976 Thousands/uL H 149-390   RBC COUNT 4 48 Million/uL  3 81-5 12   RDW 13 4 %  11 6-15 1   WBC COUNT 7 44 Thousand/uL  4 31-10 16 MPV 9 5 fL  8 9-12 7     * XR SPINE LUMBAR 2 OR 3 VIEWS INJURY [id number] 03:15PM Mikhail Trevino Order Number: [id number]   Performing Comments: rm 3     Test Name Result Flag Reference   XR SPINE LUMBAR 2 OR 3 VIEWS (Report)     LUMBAR SPINE     INDICATION: Right-sided low back pain radiating to right leg  COMPARISON: None     VIEWS: AP and lateral; 2 images     FINDINGS:     Alignment is unremarkable  There is no radiographic evidence of acute fracture or destructive osseous lesion  No significant lumbar degenerative change noted  Visualized soft tissues appear unremarkable  IMPRESSION:     Unremarkable lumbar spine         Workstation performed: [id number]     Signed by:   Bryanna Benton MD   9/19/16       Future Appointments    Date/Time Provider Specialty Site   08/10/2017 04:45 PM Christina Candelaria MD [facility]   12/08/2017 02:00 PM Janina Watson MD [place] [id number]   Electronically signed by : Latanya Sims DO; Aug 10 2017 12:39PM EST                       (Author) Universal Safety Interventions never used